# Patient Record
Sex: MALE | Race: WHITE | NOT HISPANIC OR LATINO | URBAN - METROPOLITAN AREA
[De-identification: names, ages, dates, MRNs, and addresses within clinical notes are randomized per-mention and may not be internally consistent; named-entity substitution may affect disease eponyms.]

---

## 2024-02-27 ENCOUNTER — AMBULATORY SURGICAL CENTER (OUTPATIENT)
Dept: URBAN - METROPOLITAN AREA SURGERY 2 | Facility: SURGERY | Age: 54
End: 2024-02-27
Payer: COMMERCIAL

## 2024-02-27 ENCOUNTER — OFFICE (OUTPATIENT)
Dept: URBAN - METROPOLITAN AREA PATHOLOGY 12 | Facility: PATHOLOGY | Age: 54
End: 2024-02-27
Payer: COMMERCIAL

## 2024-02-27 VITALS
HEIGHT: 68 IN | DIASTOLIC BLOOD PRESSURE: 52 MMHG | TEMPERATURE: 97.5 F | HEIGHT: 68 IN | SYSTOLIC BLOOD PRESSURE: 90 MMHG | DIASTOLIC BLOOD PRESSURE: 75 MMHG | SYSTOLIC BLOOD PRESSURE: 107 MMHG | TEMPERATURE: 98.3 F | HEART RATE: 41 BPM | HEART RATE: 42 BPM | SYSTOLIC BLOOD PRESSURE: 107 MMHG | DIASTOLIC BLOOD PRESSURE: 75 MMHG | HEART RATE: 45 BPM | OXYGEN SATURATION: 95 % | DIASTOLIC BLOOD PRESSURE: 47 MMHG | OXYGEN SATURATION: 99 % | DIASTOLIC BLOOD PRESSURE: 47 MMHG | DIASTOLIC BLOOD PRESSURE: 62 MMHG | DIASTOLIC BLOOD PRESSURE: 67 MMHG | DIASTOLIC BLOOD PRESSURE: 62 MMHG | RESPIRATION RATE: 18 BRPM | HEART RATE: 45 BPM | OXYGEN SATURATION: 99 % | DIASTOLIC BLOOD PRESSURE: 67 MMHG | SYSTOLIC BLOOD PRESSURE: 101 MMHG | SYSTOLIC BLOOD PRESSURE: 90 MMHG | HEART RATE: 46 BPM | RESPIRATION RATE: 16 BRPM | OXYGEN SATURATION: 97 % | DIASTOLIC BLOOD PRESSURE: 75 MMHG | DIASTOLIC BLOOD PRESSURE: 47 MMHG | OXYGEN SATURATION: 95 % | WEIGHT: 167 LBS | HEART RATE: 42 BPM | SYSTOLIC BLOOD PRESSURE: 140 MMHG | DIASTOLIC BLOOD PRESSURE: 62 MMHG | HEART RATE: 41 BPM | OXYGEN SATURATION: 97 % | SYSTOLIC BLOOD PRESSURE: 111 MMHG | HEART RATE: 41 BPM | DIASTOLIC BLOOD PRESSURE: 67 MMHG | SYSTOLIC BLOOD PRESSURE: 107 MMHG | HEART RATE: 46 BPM | HEART RATE: 45 BPM | WEIGHT: 167 LBS | HEIGHT: 68 IN | TEMPERATURE: 97.5 F | HEART RATE: 42 BPM | SYSTOLIC BLOOD PRESSURE: 101 MMHG | SYSTOLIC BLOOD PRESSURE: 111 MMHG | SYSTOLIC BLOOD PRESSURE: 140 MMHG | SYSTOLIC BLOOD PRESSURE: 140 MMHG | HEART RATE: 46 BPM | OXYGEN SATURATION: 95 % | TEMPERATURE: 97.5 F | TEMPERATURE: 98.3 F | DIASTOLIC BLOOD PRESSURE: 52 MMHG | TEMPERATURE: 98.3 F | SYSTOLIC BLOOD PRESSURE: 90 MMHG | RESPIRATION RATE: 18 BRPM | RESPIRATION RATE: 16 BRPM | RESPIRATION RATE: 20 BRPM | RESPIRATION RATE: 18 BRPM | SYSTOLIC BLOOD PRESSURE: 101 MMHG | DIASTOLIC BLOOD PRESSURE: 52 MMHG | OXYGEN SATURATION: 97 % | OXYGEN SATURATION: 99 % | RESPIRATION RATE: 16 BRPM | SYSTOLIC BLOOD PRESSURE: 111 MMHG | WEIGHT: 167 LBS | RESPIRATION RATE: 20 BRPM | RESPIRATION RATE: 20 BRPM

## 2024-02-27 DIAGNOSIS — D12.2 BENIGN NEOPLASM OF ASCENDING COLON: ICD-10-CM

## 2024-02-27 DIAGNOSIS — Z09 ENCOUNTER FOR FOLLOW-UP EXAMINATION AFTER COMPLETED TREATMEN: ICD-10-CM

## 2024-02-27 DIAGNOSIS — D12.0 BENIGN NEOPLASM OF CECUM: ICD-10-CM

## 2024-02-27 DIAGNOSIS — Z86.010 PERSONAL HISTORY OF COLONIC POLYPS: ICD-10-CM

## 2024-02-27 DIAGNOSIS — K57.30 DIVERTICULOSIS OF LARGE INTESTINE WITHOUT PERFORATION OR ABS: ICD-10-CM

## 2024-02-27 PROBLEM — K63.5 POLYP OF COLON: Status: ACTIVE | Noted: 2024-02-27

## 2024-02-27 PROCEDURE — 45380 COLONOSCOPY AND BIOPSY: CPT | Mod: 59 | Performed by: STUDENT IN AN ORGANIZED HEALTH CARE EDUCATION/TRAINING PROGRAM

## 2024-02-27 PROCEDURE — 45385 COLONOSCOPY W/LESION REMOVAL: CPT | Performed by: STUDENT IN AN ORGANIZED HEALTH CARE EDUCATION/TRAINING PROGRAM

## 2024-02-27 PROCEDURE — 88305 TISSUE EXAM BY PATHOLOGIST: CPT | Performed by: STUDENT IN AN ORGANIZED HEALTH CARE EDUCATION/TRAINING PROGRAM

## 2024-02-27 NOTE — SERVICEHPINOTES
Mr. Amari Kelly is a 54 yo male who presents after he received letter for recall colonoscopy with last colonoscopy being done 2020.  Patient denies prior positive Stool DNA or Hemoccult tests.   Patient denies currently taking Coumadin, Plavix, Pletal or any other blood thinners. Patient denies currently taking GLP-1 medications. Patient denies history of heart or lung problems.  Patient denies history of chest pain or shortness of breath.  No history of kidney disease, renal dialysis, bleeding problems, or liver disease.  Denies any hospitalizations or operations in the last six months.  No history of rectal bleeding. Patient denies history of hypertension or diabetes mellitus.  Patient denies any other GI symptoms.
br
br   Colonoscopy 2/27/24:  
br
Last colon by Dr. Cristina in 2020, 20 mm polyp removed with hot snare, clip placed, mild left sided diverticulosis, IH present, repeat was recommended in 3 years. Feels well, last BM was light tan, not on blood thinners, no FHx of colon cancer.

## 2024-02-27 NOTE — SERVICEHPINOTES
Mr. Amari Kelly is a 52 yo male who presents after he received letter for recall colonoscopy with last colonoscopy being done 2020.  Patient denies prior positive Stool DNA or Hemoccult tests.   Patient denies currently taking Coumadin, Plavix, Pletal or any other blood thinners. Patient denies currently taking GLP-1 medications. Patient denies history of heart or lung problems.  Patient denies history of chest pain or shortness of breath.  No history of kidney disease, renal dialysis, bleeding problems, or liver disease.  Denies any hospitalizations or operations in the last six months.  No history of rectal bleeding. Patient denies history of hypertension or diabetes mellitus.  Patient denies any other GI symptoms.
br
br   Colonoscopy 2/27/24:  
br
Last colon by Dr. Cristina in 2020, 20 mm polyp removed with hot snare, clip placed, mild left sided diverticulosis, IH present, repeat was recommended in 3 years. Feels well, last BM was light tan, not on blood thinners, no FHx of colon cancer.

## 2024-02-27 NOTE — SERVICENOTES
scope in 1:05
cecum 1:09
scope out 1:19
colon 2020 with large TA, next colon to be determined by path

## 2024-02-28 LAB
GASTRO ONE PATHOLOGY: PDF REPORT: (no result)